# Patient Record
Sex: MALE | Race: WHITE
[De-identification: names, ages, dates, MRNs, and addresses within clinical notes are randomized per-mention and may not be internally consistent; named-entity substitution may affect disease eponyms.]

---

## 2021-02-26 ENCOUNTER — HOSPITAL ENCOUNTER (INPATIENT)
Dept: HOSPITAL 82 - ED | Age: 76
LOS: 1 days | Discharge: HOME | DRG: 310 | End: 2021-02-27
Attending: INTERNAL MEDICINE | Admitting: INTERNAL MEDICINE
Payer: MEDICARE

## 2021-02-26 VITALS — HEIGHT: 70 IN | WEIGHT: 224.87 LBS | BODY MASS INDEX: 32.19 KG/M2

## 2021-02-26 DIAGNOSIS — Z95.5: ICD-10-CM

## 2021-02-26 DIAGNOSIS — Z20.822: ICD-10-CM

## 2021-02-26 DIAGNOSIS — I25.10: ICD-10-CM

## 2021-02-26 DIAGNOSIS — I25.2: ICD-10-CM

## 2021-02-26 DIAGNOSIS — J40: ICD-10-CM

## 2021-02-26 DIAGNOSIS — M19.90: ICD-10-CM

## 2021-02-26 DIAGNOSIS — Z87.891: ICD-10-CM

## 2021-02-26 DIAGNOSIS — I10: ICD-10-CM

## 2021-02-26 DIAGNOSIS — Z85.46: ICD-10-CM

## 2021-02-26 DIAGNOSIS — I48.91: Primary | ICD-10-CM

## 2021-02-27 VITALS — SYSTOLIC BLOOD PRESSURE: 97 MMHG | DIASTOLIC BLOOD PRESSURE: 67 MMHG

## 2021-02-27 VITALS — SYSTOLIC BLOOD PRESSURE: 137 MMHG | DIASTOLIC BLOOD PRESSURE: 66 MMHG

## 2021-02-27 VITALS — DIASTOLIC BLOOD PRESSURE: 78 MMHG | SYSTOLIC BLOOD PRESSURE: 139 MMHG

## 2021-02-27 VITALS — DIASTOLIC BLOOD PRESSURE: 98 MMHG | SYSTOLIC BLOOD PRESSURE: 171 MMHG

## 2021-02-27 VITALS — DIASTOLIC BLOOD PRESSURE: 96 MMHG | SYSTOLIC BLOOD PRESSURE: 139 MMHG

## 2021-02-27 VITALS — DIASTOLIC BLOOD PRESSURE: 66 MMHG | SYSTOLIC BLOOD PRESSURE: 94 MMHG

## 2021-02-27 VITALS — DIASTOLIC BLOOD PRESSURE: 82 MMHG | SYSTOLIC BLOOD PRESSURE: 107 MMHG

## 2021-02-27 VITALS — SYSTOLIC BLOOD PRESSURE: 103 MMHG | DIASTOLIC BLOOD PRESSURE: 72 MMHG

## 2021-02-27 VITALS — DIASTOLIC BLOOD PRESSURE: 61 MMHG | SYSTOLIC BLOOD PRESSURE: 94 MMHG

## 2021-02-27 VITALS — SYSTOLIC BLOOD PRESSURE: 119 MMHG | DIASTOLIC BLOOD PRESSURE: 88 MMHG

## 2021-02-27 VITALS — SYSTOLIC BLOOD PRESSURE: 119 MMHG | DIASTOLIC BLOOD PRESSURE: 69 MMHG

## 2021-02-27 VITALS — SYSTOLIC BLOOD PRESSURE: 118 MMHG | DIASTOLIC BLOOD PRESSURE: 68 MMHG

## 2021-02-27 VITALS — DIASTOLIC BLOOD PRESSURE: 72 MMHG | SYSTOLIC BLOOD PRESSURE: 92 MMHG

## 2021-02-27 VITALS — DIASTOLIC BLOOD PRESSURE: 67 MMHG | SYSTOLIC BLOOD PRESSURE: 146 MMHG

## 2021-02-27 LAB
ALBUMIN SERPL-MCNC: 4 G/DL (ref 3.2–5)
ALP SERPL-CCNC: 69 U/L (ref 38–126)
ANION GAP SERPL CALCULATED.3IONS-SCNC: 11 MMOL/L
APTT PPP: 24.2 SECONDS (ref 20–32.5)
AST SERPL-CCNC: 28 U/L (ref 19–48)
BASOPHILS NFR BLD AUTO: 1 % (ref 0–3)
BUN SERPL-MCNC: 20 MG/DL (ref 8–23)
BUN/CREAT SERPL: 22
CHLORIDE SERPL-SCNC: 107 MMOL/L (ref 95–108)
CO2 SERPL-SCNC: 24 MMOL/L (ref 22–30)
CREAT SERPL-MCNC: 0.9 MG/DL (ref 0.7–1.3)
D DIMER PPP FEU-MCNC: 0.79 MG/L (ref 0.19–0.6)
EOSINOPHIL NFR BLD AUTO: 6 % (ref 0–8)
ERYTHROCYTE [DISTWIDTH] IN BLOOD BY AUTOMATED COUNT: 13.3 % (ref 11.5–15.5)
HCT VFR BLD AUTO: 45.7 % (ref 39–50)
HGB BLD-MCNC: 15.4 G/DL (ref 14–18)
IMM GRANULOCYTES NFR BLD: 0.2 % (ref 0–5)
INR PPP: 1 RATIO (ref 0.7–1.3)
LYMPHOCYTES NFR BLD: 28 % (ref 15–41)
MCH RBC QN AUTO: 30.5 PG  CALC (ref 26–32)
MCHC RBC AUTO-ENTMCNC: 33.7 G/DL CAL (ref 32–36)
MCV RBC AUTO: 90.5 FL  CALC (ref 80–100)
MONOCYTES NFR BLD AUTO: 10 % (ref 2–13)
MYOGLOBIN SERPL-MCNC: 76 NG/ML (ref 0–121)
NEUTROPHILS # BLD AUTO: 2.97 THOU/UL (ref 1.82–7.42)
NEUTROPHILS NFR BLD AUTO: 55 % (ref 42–76)
PLATELET # BLD AUTO: 154 THOU/UL (ref 130–400)
POTASSIUM SERPL-SCNC: 4.1 MMOL/L (ref 3.5–5.1)
PROT SERPL-MCNC: 6.3 G/DL (ref 6.3–8.2)
PROTHROMBIN TIME: 10.4 SECONDS (ref 9–12.5)
RBC # BLD AUTO: 5.05 MILL/UL (ref 4.7–6.1)
SODIUM SERPL-SCNC: 138 MMOL/L (ref 137–146)

## 2021-02-27 NOTE — NUR
75 yr old white male admitted icu6 per wc from er. amb self to bed. bed weight
obtained. denies distress. cardiac monitor shows a fib hr 83. #18 lac cardizem
gtt infusing # 5mg/hr, ns infusing @ 20cchr. history obtained per pt & er
record. oriented to room. fall precautions initiated.
ADDITIONAL TESTS ORDERED. ADMIT PENDING
AMBULATED TO ROOM WITH STEADY GAIT
Admission Note
 
Report Given to:         ANGEL DAY
Transported by:          X Wheelchair           Stretcher
 
Transported with:        X Nurse     Transporter   X Patent IV   X O2
                         X Cardiac Monitor
 
Location:                X ICU       MS2
DR. CARDOZO EXAMINING PT.  PT STATES HE FEELS FINE, NO MORE FEELING OF RAPID
HEART RATE.  AND HE WOULD LIKE TO GO SEE HIS OWN DOCTOR IN [place],  STATES
HE HAS FAMILY MEMEMBER THAT IS NP IN CLINIC THERE AND WILL BE DISCHARGED AND
GO UP TO MAKE APPOINTMENTS TODAY.
IV INTIATED WITH BLOOD SPECIMENS OBTAINED AND TOLERATED WELL. COVID SWABS
COLLETED AS PER ORDER.
PT DISCHARGED WITH INST. AND W/C TO ER OUTSIDE TO WAIT FOR WIFE.
PT MEDICATED WITH CARDIZEM 25 MG SLOW IVP. PT TOLERATED PUSH WITHOUT
DIFFICULTY. HR RANGING FROM 80-90S AFIB. BLOOD PRESSURE 131/61.
PT AOX3. RESP EVEN AND UNLABORED. SKIN WARM AND DRY. REPORTS "CONTINUED RUSHED
FEELING IN CHEST." CARDIAC MONITOR CONT TO BE IN PLACE.
PT REPORT PROVIDED TO TAMAR ORTIZ. CARE RELINQUISHED AT THIS TIME.
PT REPORT RECEIVED FROM NIGHT SHIFT, PT HEART RATE IN THE 80'S, DENIES ANY
SOB, DENIES CHEST PAIN, UP IT BED TALKING ON PHONE, ALERT/ORIENTED X3.
PT STATES HE WOULD LIKE TO BE TRANSFERRED TO HIS CARDIOLOGIST IN [place],
BECAUSE THAT IS WHERE HE HAS HIS OWN HOME AND OWN DOCTORS,  ADVISED HIM TO
WAIT TO SEE WHAT DR. CARDOZO RECOMMENDS WHEN HE DOES HIS ROUNDS.  PT REMAINS
ALERT/ORIENTED X3,  SINUS RHYTHM IN THE 80'S.
RESTING COMFORTABLY. AWAITING LAB RESULTS.
TYPED UP DISCHARGE INST AND HIGHLIGHTED CHANGES TO MEDICATIONS AND THOUROUGHLY
DISCUSSED  INSTRUCTIONS WITH PT ON MONITER HEART RATE AND BLOOD PRESSURE AND
TO GO IMMEDIATELY TO THE NEAREST HOSPITAL IF ANY SYMPTOMS REOCCUR.  PT VOICES
UNDERSTANDING.  ADVISED HE COULD CALL HIS WIFE FOR  AROUND 3.
WILL START TO WEAN PT OFF OF HIS CARDIZEM GTT TO BE ABLE TO GO HOME.
eyes closed. no distress. cardiac monitor shows a fib hr 52
lab here. blood drawn.
negative